# Patient Record
(demographics unavailable — no encounter records)

---

## 2025-02-12 NOTE — DISCUSSION/SUMMARY
[de-identified] : Right knee pain greater than 1 month after hyperextension injury playing lacrosse the subjective instability.  We will obtain an MRI to further evaluate he will follow-up after MRI

## 2025-02-12 NOTE — PHYSICAL EXAM
[de-identified] : General Exam   Well developed, well nourished No apparent distress Oriented to person, place, and time Mood: Normal Affect: Normal Balance and coordination: Normal Gait: Normal  right knee exam   Skin: Clean, dry, intact Inspection: No obvious malalignment, no masses, no swelling, no effusion. Tenderness: no MJLT. No LJLT. No tenderness over the medial and lateral patella facets. No ttp medial/lateral epicondyle, patella tendon, tibial tubercle, pes anserinus, or proximal fibula. ROM: 0 to 140 no pain with deep flexion Stability: Stable to varus,  lachman testing.  Slight laxity of the anterior drawer slight laxity to valgus Additional tests: Negative McMurrays test, Negative patellar grind test. Strength: 5/5 Q/H/TA/GS/EHL, no atrophy Neuro: In tact to light touch throughout in dp/sp/tib/xavier/saph nerve districutions, DTR's normal Pulses: 2+ DP/PT pulses. [de-identified] : The following radiographs were ordered and read by me during this patients visit. I reviewed each radiograph in detail with the patient and discussed the findings as highlighted below.  3 views right knee were obtained today joint space well- maintained normal alignment no fracture.

## 2025-02-12 NOTE — HISTORY OF PRESENT ILLNESS
[de-identified] :  20-year-old male right knee pain for 1 month.  Hyperextension injury while playing lacrosse he reports intermittent swelling and pain since that time he has difficulty swallowing his pain and subjective instability.  This is his first evaluation The patient's past medical history, past surgical history, medications, allergies, and social history were reviewed by me today with the patient and documented accordingly. In addition, the patient's family history, which is noncontributory to this visit, was also reviewed.

## 2025-07-10 NOTE — ASSESSMENT
[FreeTextEntry1] : ASSESSMENT: The patient comes in today with chronic exacerbated complaints of left wrist discomfort.  We have discussed ulnar-sided wrist pain ECU and TFCC related pathology.  He is a .  He has been bracing for quite some time.  Today patient elects for injections. We have had a thorough discussion regarding the patient's wrist and hand MRI with discussed the intra-articular as well as extra-articular pathologic findings including findings related to the radiocarpal joint TFC, capsule, any swelling and or tendon pathology.  The patient verbalized complete understanding. We reviewed the images together.   The patient was adequately and thoroughly informed of my assessment of their current condition(s).  - This may diminish bodily function for the extremity. We discussed prognosis, tx modalities including operative and nonoperative options for the above diagnostic assessment. As always, 2nd opinion is always provided as an option.  When accessible, I was able to review other physicians note(s) including reviewing other tests, imaging results as well as personally view these results for my own interpretation.   *** Injection procedure for left wrist ECU tendon sheath: The risks and benefits of a steroid injection were discussed in detail. The risks include but are not limited to: pain, infection, swelling, flare response, bleeding, subcutaneous fat atrophy, skin depigmentation and/or elevation of blood sugar. The risk of incomplete resolution of symptoms, recurrence and additional intervention was reviewed and considered by the patient. The patient agreed to proceed and under a sterile prep, I injected 1 unit 6mg into 1 cc of a combination of Celestone and Lidocaine into the above stated location for the procedure. The patient tolerated the injection well.   *** Injection procedure for left wrist medium joint at TFCC: The risks and benefits of a steroid injection were discussed in detail. The risks include but are not limited to: pain, infection, swelling, flare response, bleeding, subcutaneous fat atrophy, skin depigmentation and/or elevation of blood sugar. The risk of incomplete resolution of symptoms, recurrence and additional intervention was reviewed and considered by the patient. The patient agreed to proceed and under a sterile prep, I injected 1 unit 6mg into 1 cc of a combination of Celestone and Lidocaine into the above stated location for the procedure. The patient tolerated the injection well.    The patient was adequately and thoroughly informed of my assessment of their current condition(s).  DISCUSSION: 1.  Injections as above.  Bracing activity modification.  .  Follow-up 6 weeks as requested 2.  We have thoroughly reviewed and reviewed patient's left wrist MRI and I have given him my own interpretation as well 3. [x]

## 2025-07-10 NOTE — HISTORY OF PRESENT ILLNESS
[FreeTextEntry1] : Lb is a very pleasant 20-year-old male  presenting today with a chronic history of significant left wrist discomfort difficulty with rotational activities.  He has trialed wrist bracing.  Presents with left wrist MRI.

## 2025-07-10 NOTE — PHYSICAL EXAM
[de-identified] : Examination of the [left] wrist reveals tenderness at the distal ulna dorsal with pain upon compression of the fovea. There is discomfort with ulnar grinding as well as with ends of pronation and supination at the ulnar wrist. There is discomfort with compression of the dorsal triquetrum. Examination of the [left] wrist reveals discomfort with compression at the level of the ECU. Synergy test elicits discomfort as well as resisted ulnar wrist extension.  [de-identified] : [4] views of [bilateral hands and wrists] were obtained today in my office and were seen by me and discussed with the patient.  These neutral